# Patient Record
Sex: MALE | Race: WHITE | NOT HISPANIC OR LATINO
[De-identification: names, ages, dates, MRNs, and addresses within clinical notes are randomized per-mention and may not be internally consistent; named-entity substitution may affect disease eponyms.]

---

## 2022-04-22 ENCOUNTER — RESULT REVIEW (OUTPATIENT)
Age: 52
End: 2022-04-22

## 2022-05-16 ENCOUNTER — OUTPATIENT (OUTPATIENT)
Dept: OUTPATIENT SERVICES | Facility: HOSPITAL | Age: 52
LOS: 1 days | End: 2022-05-16
Payer: COMMERCIAL

## 2022-05-16 VITALS
OXYGEN SATURATION: 100 % | RESPIRATION RATE: 15 BRPM | WEIGHT: 171.74 LBS | HEIGHT: 68.5 IN | SYSTOLIC BLOOD PRESSURE: 125 MMHG | DIASTOLIC BLOOD PRESSURE: 89 MMHG | HEART RATE: 77 BPM | TEMPERATURE: 98 F

## 2022-05-16 DIAGNOSIS — N48.6 INDURATION PENIS PLASTICA: ICD-10-CM

## 2022-05-16 DIAGNOSIS — Z98.890 OTHER SPECIFIED POSTPROCEDURAL STATES: Chronic | ICD-10-CM

## 2022-05-16 DIAGNOSIS — E80.4 GILBERT SYNDROME: Chronic | ICD-10-CM

## 2022-05-16 DIAGNOSIS — Z01.818 ENCOUNTER FOR OTHER PREPROCEDURAL EXAMINATION: ICD-10-CM

## 2022-05-16 PROBLEM — Z00.00 ENCOUNTER FOR PREVENTIVE HEALTH EXAMINATION: Status: ACTIVE | Noted: 2022-05-16

## 2022-05-16 LAB
HCT VFR BLD CALC: 47.1 % — SIGNIFICANT CHANGE UP (ref 39–50)
HGB BLD-MCNC: 15.3 G/DL — SIGNIFICANT CHANGE UP (ref 13–17)
MCHC RBC-ENTMCNC: 27.6 PG — SIGNIFICANT CHANGE UP (ref 27–34)
MCHC RBC-ENTMCNC: 32.5 GM/DL — SIGNIFICANT CHANGE UP (ref 32–36)
MCV RBC AUTO: 85 FL — SIGNIFICANT CHANGE UP (ref 80–100)
NRBC # BLD: 0 /100 WBCS — SIGNIFICANT CHANGE UP (ref 0–0)
PLATELET # BLD AUTO: 259 K/UL — SIGNIFICANT CHANGE UP (ref 150–400)
RBC # BLD: 5.54 M/UL — SIGNIFICANT CHANGE UP (ref 4.2–5.8)
RBC # FLD: 13.3 % — SIGNIFICANT CHANGE UP (ref 10.3–14.5)
WBC # BLD: 6.91 K/UL — SIGNIFICANT CHANGE UP (ref 3.8–10.5)
WBC # FLD AUTO: 6.91 K/UL — SIGNIFICANT CHANGE UP (ref 3.8–10.5)

## 2022-05-16 PROCEDURE — G0463: CPT

## 2022-05-16 PROCEDURE — 85027 COMPLETE CBC AUTOMATED: CPT

## 2022-05-16 NOTE — H&P PST ADULT - FALL HARM RISK - UNIVERSAL INTERVENTIONS
Bed in lowest position, wheels locked, appropriate side rails in place/Call bell, personal items and telephone in reach/Instruct patient to call for assistance before getting out of bed or chair/Non-slip footwear when patient is out of bed/[place] to call system/Physically safe environment - no spills, clutter or unnecessary equipment/Purposeful Proactive Rounding/Room/bathroom lighting operational, light cord in reach

## 2022-05-16 NOTE — H&P PST ADULT - NEGATIVE GASTROINTESTINAL SYMPTOMS
no nausea/no vomiting/no constipation/no change in bowel habits/no abdominal pain/no melena/no hematochezia/no jaundice

## 2022-05-16 NOTE — H&P PST ADULT - NSICDXPASTSURGICALHX_GEN_ALL_CORE_FT
PAST SURGICAL HISTORY:  H/O colonoscopy -april 2022    S/P inguinal hernia repair -bilateral 2016

## 2022-05-16 NOTE — H&P PST ADULT - PROBLEM SELECTOR PLAN 1
Moustapha Procedure  -cbc done at [facility]  -preop instructions provided. All questions answered

## 2022-05-16 NOTE — H&P PST ADULT - HISTORY OF PRESENT ILLNESS
51 year old male with PMH of HTN, Gilbert Syndrome, Seasonal Allergies with Penis Angulation. Patient endorses that he wishes to correct angulation. He denies fever, chills, gross hematuria, dysuria. He presents to PST today for evaluation prior to scheduled Moustapha Procedure    preop covid swab 6/4 @ [facility]

## 2022-05-16 NOTE — H&P PST ADULT - NSICDXPASTMEDICALHX_GEN_ALL_CORE_FT
PAST MEDICAL HISTORY:  Gilbert syndrome -not under therapy, last LFT's done september 2021, normal as per patient    HTN (hypertension)     Seasonal allergies

## 2022-05-30 ENCOUNTER — TRANSCRIPTION ENCOUNTER (OUTPATIENT)
Age: 52
End: 2022-05-30

## 2022-06-04 ENCOUNTER — OUTPATIENT (OUTPATIENT)
Dept: OUTPATIENT SERVICES | Facility: HOSPITAL | Age: 52
LOS: 1 days | End: 2022-06-04
Payer: COMMERCIAL

## 2022-06-04 DIAGNOSIS — Z98.890 OTHER SPECIFIED POSTPROCEDURAL STATES: Chronic | ICD-10-CM

## 2022-06-04 DIAGNOSIS — Z11.52 ENCOUNTER FOR SCREENING FOR COVID-19: ICD-10-CM

## 2022-06-04 LAB — SARS-COV-2 RNA SPEC QL NAA+PROBE: SIGNIFICANT CHANGE UP

## 2022-06-04 PROCEDURE — U0005: CPT

## 2022-06-04 PROCEDURE — C9803: CPT

## 2022-06-04 PROCEDURE — U0003: CPT

## 2022-06-29 PROBLEM — E80.4 GILBERT SYNDROME: Chronic | Status: ACTIVE | Noted: 2022-05-16

## 2022-06-29 PROBLEM — I10 ESSENTIAL (PRIMARY) HYPERTENSION: Chronic | Status: ACTIVE | Noted: 2022-05-16

## 2022-06-29 PROBLEM — J30.2 OTHER SEASONAL ALLERGIC RHINITIS: Chronic | Status: ACTIVE | Noted: 2022-05-16

## 2022-06-30 ENCOUNTER — APPOINTMENT (OUTPATIENT)
Dept: ORTHOPEDIC SURGERY | Facility: CLINIC | Age: 52
End: 2022-06-30

## 2022-06-30 VITALS — WEIGHT: 170 LBS | BODY MASS INDEX: 25.47 KG/M2 | HEIGHT: 68.5 IN

## 2022-06-30 DIAGNOSIS — I10 ESSENTIAL (PRIMARY) HYPERTENSION: ICD-10-CM

## 2022-06-30 PROCEDURE — 99213 OFFICE O/P EST LOW 20 MIN: CPT

## 2022-06-30 PROCEDURE — 73564 X-RAY EXAM KNEE 4 OR MORE: CPT | Mod: LT

## 2022-06-30 NOTE — HISTORY OF PRESENT ILLNESS
[8] : 8 [0] : 0 [Dull/Aching] : dull/aching [Sharp] : sharp [Intermittent] : intermittent [Rest] : rest [Meds] : meds [Ice] : ice [Exercising] : exercising [Stairs] : stairs [de-identified] : 51M here with left knee pain for about a week. He noticed anterior knee pain while running. Pain has progressively worsened. He hears some "clicking" in the front of his knee\par \par Hx partial quad tendon tear in 2020 treated non-op\par PMH: none [] : no [FreeTextEntry5] :  HARJIT IRIZARRY is a 51 year male who is here today for lt knee pain. he states he does a lot of running and has been having pain for about a week ago. the pain has been getting worst the past few days.  [FreeTextEntry1] : lt knee  [FreeTextEntry9] : knee brace

## 2022-06-30 NOTE — PHYSICAL EXAM
[Left] : left knee [5___] : hamstring 5[unfilled]/5 [] : non-antalgic [TWNoteComboBox7] : flexion 130 degrees

## 2022-06-30 NOTE — DISCUSSION/SUMMARY
[de-identified] : 51m with left knee chondromalacia\par 1) treatment options reviewed\par 2) voltaren gel\par 3) activity modification, stretching\par 4) discussed possibility csi \par 5) fu 4 weeks

## 2022-06-30 NOTE — IMAGING
[Left] : left knee [AP] : anteroposterior [Lateral] : lateral [[place]] : skyline [AP Standing] : anteroposterior standing [There are no fractures, subluxations or dislocations. No significant abnormalities are seen] : There are no fractures, subluxations or dislocations. No significant abnormalities are seen

## 2022-07-12 ENCOUNTER — APPOINTMENT (OUTPATIENT)
Dept: ORTHOPEDIC SURGERY | Facility: CLINIC | Age: 52
End: 2022-07-12

## 2022-07-12 VITALS — WEIGHT: 170 LBS | BODY MASS INDEX: 25.76 KG/M2 | HEIGHT: 68 IN

## 2022-07-12 PROCEDURE — 99213 OFFICE O/P EST LOW 20 MIN: CPT

## 2022-07-12 NOTE — HISTORY OF PRESENT ILLNESS
[3] : 3 [0] : 0 [Dull/Aching] : dull/aching [Sharp] : sharp [Intermittent] : intermittent [Rest] : rest [Meds] : meds [Ice] : ice [Exercising] : exercising [Stairs] : stairs [de-identified] : 07/12/22: Here to f/up left knee. Has seen partial improvement with his left knee pain. Has been limiting his running distances. Notes some anterior pain when running. \par \par 06/30/22: 51M here with left knee pain for about a week. He noticed anterior knee pain while running. Pain has progressively worsened. He hears some "clicking" in the front of his knee\par \par Hx partial quad tendon tear in 2020 treated non-op\par PMH: none [] : no [FreeTextEntry1] : lt knee  [FreeTextEntry5] :  HARJIT IRIZARRY is a 51 year male who is here today for lt knee pain. He is doing better since last visit. he still has pain when going down stairs and running.  [FreeTextEntry9] : knee brace  [de-identified] : u

## 2022-07-12 NOTE — DISCUSSION/SUMMARY
[de-identified] : 51m with chondromalacia patella/pfs\par 1) cryotherapy, rest and activity/exercise modification\par 2) discussed availability of csi if pain worsens. \par 3) rtc prn\par \par Entered by Becky Leyva acting as scribe.\par \par \par

## 2022-07-19 ENCOUNTER — APPOINTMENT (OUTPATIENT)
Dept: ORTHOPEDIC SURGERY | Facility: CLINIC | Age: 52
End: 2022-07-19

## 2022-07-19 VITALS — BODY MASS INDEX: 25.76 KG/M2 | HEIGHT: 68 IN | WEIGHT: 170 LBS

## 2022-07-19 PROCEDURE — 99213 OFFICE O/P EST LOW 20 MIN: CPT

## 2022-07-19 NOTE — DISCUSSION/SUMMARY
[de-identified] : 51m with continued left knee glory despite conservative treatment\par 1) mri of left knee\par 2) cryotherapy\par 3) activity modification\par 4) rtc after mri

## 2022-07-19 NOTE — PHYSICAL EXAM
[Left] : left knee [5___] : hamstring 5[unfilled]/5 [] : non-antalgic [TWNoteComboBox7] : flexion 135 degrees [de-identified] : extension 0 degrees

## 2022-07-19 NOTE — HISTORY OF PRESENT ILLNESS
[8] : 8 [1] : 2 [Dull/Aching] : dull/aching [Sharp] : sharp [Intermittent] : intermittent [Rest] : rest [Meds] : meds [Ice] : ice [Exercising] : exercising [Stairs] : stairs [de-identified] : 07/19/22: here for f/up for left knee. has noticed increased pain and soreness in left knee with running. currently training for a marathon. has noticed with increased milage the pain has increased under the kneecap. \par \par 07/12/22: Here to f/up left knee. Has seen partial improvement with his left knee pain. Has been limiting his running distances. Notes some anterior pain when running. \par \par 06/30/22: 51M here with left knee pain for about a week. He noticed anterior knee pain while running. Pain has progressively worsened. He hears some "clicking" in the front of his knee\par \par Hx partial quad tendon tear in 2020 treated non-op\par PMH: none [] : no [FreeTextEntry1] : lt knee  [FreeTextEntry5] :  HARJIT IRIZARRY is a 51 year male who is here today for lt knee pain. He is doing worst since last visit. He states he went running last wednesday and started having pain and felt as if his knee was cracking as he was running.  [FreeTextEntry9] : knee brace

## 2022-07-20 ENCOUNTER — FORM ENCOUNTER (OUTPATIENT)
Age: 52
End: 2022-07-20

## 2022-07-21 ENCOUNTER — APPOINTMENT (OUTPATIENT)
Dept: MRI IMAGING | Facility: CLINIC | Age: 52
End: 2022-07-21

## 2022-07-21 PROCEDURE — 73721 MRI JNT OF LWR EXTRE W/O DYE: CPT | Mod: LT

## 2022-07-26 ENCOUNTER — APPOINTMENT (OUTPATIENT)
Dept: ORTHOPEDIC SURGERY | Facility: CLINIC | Age: 52
End: 2022-07-26

## 2022-07-26 VITALS — HEIGHT: 68 IN | BODY MASS INDEX: 25.76 KG/M2 | WEIGHT: 170 LBS

## 2022-07-26 DIAGNOSIS — Z78.9 OTHER SPECIFIED HEALTH STATUS: ICD-10-CM

## 2022-07-26 PROCEDURE — 99214 OFFICE O/P EST MOD 30 MIN: CPT

## 2022-07-26 NOTE — DISCUSSION/SUMMARY
[de-identified] : 51m with left knee chondral defect patella. \par 1) discussed availability of visco injections for the left knee pt would like to proceed, will request auth. \par 2) cryotherapy, rest and activity/exercise modification \par 3) rtc with auth for injections\par \par Entered by Becky Leyva acting as scribe.\par

## 2022-07-26 NOTE — HISTORY OF PRESENT ILLNESS
[2] : 2 [0] : 0 [Dull/Aching] : dull/aching [Sharp] : sharp [Intermittent] : intermittent [Rest] : rest [Meds] : meds [Ice] : ice [Exercising] : exercising [Stairs] : stairs [de-identified] : 07/26/22: Here to f/up left knee and review MRI results. \par \par 07/19/22: here for f/up for left knee. has noticed increased pain and soreness in left knee with running. currently training for a marathon. has noticed with increased milage the pain has increased under the kneecap. \par \par 07/12/22: Here to f/up left knee. Has seen partial improvement with his left knee pain. Has been limiting his running distances. Notes some anterior pain when running. \par \par 06/30/22: 51M here with left knee pain for about a week. He noticed anterior knee pain while running. Pain has progressively worsened. He hears some "clicking" in the front of his knee\par \par Hx partial quad tendon tear in 2020 treated non-op\par PMH: none [] : no [FreeTextEntry1] : lt knee  [FreeTextEntry5] :  HARJIT IRIZARRY is a 51 year male who is here today for lt knee MRI results [FreeTextEntry9] : knee brace

## 2022-07-26 NOTE — DATA REVIEWED
[MRI] : MRI [Left] : left [Knee] : knee [Report was reviewed and noted in the chart] : The report was reviewed and noted in the chart [I independently reviewed and interpreted images and report] : I independently reviewed and interpreted images and report [I reviewed the films/CD] : I reviewed the films/CD [FreeTextEntry1] : 07.21.22\par 1. Marrow edema in the central and lateral patella measuring 3 cm with associated 1 cm chondral defect in the mid lateral patella facet and mild patellofemoral effusion and synovitis without MRI evidence of loose body. The findings could represent acute osteochondral injury. The possibility of loose chondral fragments should be considered.\par 2. No ligament tear, meniscal tear, or extensor mechanism tear.\par 3. Clinical correlation and follow up is recommended.

## 2022-08-02 ENCOUNTER — APPOINTMENT (OUTPATIENT)
Dept: ORTHOPEDIC SURGERY | Facility: CLINIC | Age: 52
End: 2022-08-02

## 2022-08-02 VITALS — BODY MASS INDEX: 25.76 KG/M2 | WEIGHT: 170 LBS | HEIGHT: 68 IN

## 2022-08-02 DIAGNOSIS — M25.562 PAIN IN LEFT KNEE: ICD-10-CM

## 2022-08-02 PROCEDURE — 20611 DRAIN/INJ JOINT/BURSA W/US: CPT

## 2022-08-02 PROCEDURE — 99213 OFFICE O/P EST LOW 20 MIN: CPT | Mod: 25

## 2022-08-02 NOTE — PROCEDURE
[FreeTextEntry3] : The risks, benefits and contents of the injection have been discussed.  Risks include but are not limited to allergic reaction, flare reaction, permanent white skin discoloration at the injection site and infection.  The patient understands the risks and agrees to having the injection.  All questions have been answered.\par \par Large joint injection was performed  of the [] knee. The indication for this procedure was x-ray evidence of Osteoarthritis on this or prior visit. The site was prepped with alcohol and betadine. An injection of Lidocaine 3cc of 1% , Euflexxa, # 1 was used. \par \par Patient was advised to call if redness, pain or fever occur and apply ice for 15 minutes out of every hour for the next 12-24 hours as tolerated. \par \par Patient has tried OTC's including aspirin, Ibuprofen, Aleve, etc or prescription NSAIDS, and/or exercises at home and/or physical therapy without satisfactory response, patient had decreased mobility in the joint and the risks benefits, and alternatives have been discussed, and verbal consent was obtained. \par \par Ultrasound guidance was indicated for this patient due to prior failure or difficult injection.\par \par

## 2022-08-02 NOTE — HISTORY OF PRESENT ILLNESS
[2] : 2 [Dull/Aching] : dull/aching [Sharp] : sharp [Intermittent] : intermittent [Rest] : rest [Meds] : meds [Ice] : ice [Exercising] : exercising [Stairs] : stairs [1] : 1 [Orthovisc] : Orthovisc [Localized] : localized [0] : 0 [de-identified] : 8/2/22: f/up L knee, orthovisc #1 today\par \par 07/26/22: Here to f/up left knee and review MRI results. \par \par 07/19/22: here for f/up for left knee. has noticed increased pain and soreness in left knee with running. currently training for a marathon. has noticed with increased milage the pain has increased under the kneecap. \par \par 07/12/22: Here to f/up left knee. Has seen partial improvement with his left knee pain. Has been limiting his running distances. Notes some anterior pain when running. \par \par 06/30/22: 51M here with left knee pain for about a week. He noticed anterior knee pain while running. Pain has progressively worsened. He hears some "clicking" in the front of his knee\par \par Hx partial quad tendon tear in 2020 treated non-op\par PMH: none [] : Post Surgical Visit: no [FreeTextEntry1] : lt knee  [FreeTextEntry5] :  HARJIT IRIZARRY is a 51 year male who is here today for lt knee MRI results [FreeTextEntry9] : knee brace  [de-identified] : left knee

## 2022-08-02 NOTE — DISCUSSION/SUMMARY
[de-identified] : 51m with left knee chondral defect patella. \par 1) orthovisc #1 L knee, tolerated well\par 2) cryotherapy\par 3) rto next week\par \par \par Progress Note completed by Luke Ornelas PA-C\par * Dr. Erazo - The documentation recorded accurately reflects the decisions made by me during this visit.\par

## 2022-08-02 NOTE — PHYSICAL EXAM
[Left] : left knee [NL (0)] : extension 0 degrees [5___] : hamstring 5[unfilled]/5 [] : no extensor lag [TWNoteComboBox7] : flexion 130 degrees

## 2022-08-10 ENCOUNTER — APPOINTMENT (OUTPATIENT)
Dept: ORTHOPEDIC SURGERY | Facility: CLINIC | Age: 52
End: 2022-08-10
Payer: COMMERCIAL

## 2022-08-10 VITALS — WEIGHT: 170 LBS | BODY MASS INDEX: 25.76 KG/M2 | HEIGHT: 68 IN

## 2022-08-10 PROCEDURE — 20611 DRAIN/INJ JOINT/BURSA W/US: CPT

## 2022-08-10 PROCEDURE — 99212 OFFICE O/P EST SF 10 MIN: CPT | Mod: 25

## 2022-08-10 NOTE — PROCEDURE
[FreeTextEntry3] : Large joint injection was performed  of the left knee. The indication for this procedure was x-ray evidence of Osteoarthritis on this or prior visit. The site was prepped with alcohol and betadine. An injection of Lidocaine 3cc of 1% , Orthovisc, # 2 was used. \par \par Patient was advised to call if redness, pain or fever occur and apply ice for 15 minutes out of every hour for the next 12-24 hours as tolerated. \par \par Patient has tried OTC's including aspirin, Ibuprofen, Aleve, etc or prescription NSAIDS, and/or exercises at home and/or physical therapy without satisfactory response, patient had decreased mobility in the joint and the risks benefits, and alternatives have been discussed, and verbal consent was obtained. \par \par The risks, benefits and contents of the injection have been discussed.  Risks include but are not limited to allergic reaction, flare reaction, permanent white skin discoloration at the injection site and infection.  The patient understands the risks and agrees to having the injection.  All questions have been answered.\par \par Ultrasound guidance was indicated for this patient due to prior failure or difficult injection.\par

## 2022-08-10 NOTE — HISTORY OF PRESENT ILLNESS
[2] : 2 [0] : 0 [Dull/Aching] : dull/aching [Localized] : localized [Sharp] : sharp [Intermittent] : intermittent [Rest] : rest [Meds] : meds [Ice] : ice [Exercising] : exercising [Stairs] : stairs [1] : 1 [Orthovisc] : Orthovisc [de-identified] : 8/10/22: F/U left knee orthovisc #2 today. Has had some improvement, less pain since first injection. \par \par 8/2/22: f/up L knee, orthovisc #1 today\par \par 07/26/22: Here to f/up left knee and review MRI results. \par \par 07/19/22: here for f/up for left knee. has noticed increased pain and soreness in left knee with running. currently training for a marathon. has noticed with increased milage the pain has increased under the kneecap. \par \par 07/12/22: Here to f/up left knee. Has seen partial improvement with his left knee pain. Has been limiting his running distances. Notes some anterior pain when running. \par \par 06/30/22: 51M here with left knee pain for about a week. He noticed anterior knee pain while running. Pain has progressively worsened. He hears some "clicking" in the front of his knee\par \par Hx partial quad tendon tear in 2020 treated non-op\par PMH: none [] : Post Surgical Visit: no [FreeTextEntry1] : lt knee  [FreeTextEntry5] :  HARJIT IRIZARRY is a 51 year male who is here today for lt knee MRI results [FreeTextEntry9] : knee brace  [de-identified] : left knee

## 2022-08-10 NOTE — DISCUSSION/SUMMARY
[de-identified] : 51m with left knee chondral defect patella. Orthovisc #2 Injection tolerated well. Post injection instructions reviewed.\par 1) wbat, cryotherapy\par 2) rtc 1 week\par \par Entered by Becky Leyva acting as scribe.\par \par

## 2022-08-17 ENCOUNTER — APPOINTMENT (OUTPATIENT)
Dept: ORTHOPEDIC SURGERY | Facility: CLINIC | Age: 52
End: 2022-08-17
Payer: COMMERCIAL

## 2022-08-17 PROCEDURE — 99212 OFFICE O/P EST SF 10 MIN: CPT | Mod: 25

## 2022-08-17 PROCEDURE — 20611 DRAIN/INJ JOINT/BURSA W/US: CPT

## 2022-08-17 NOTE — PROCEDURE
[FreeTextEntry3] : Large joint injection was performed  of the left knee. The indication for this procedure was x-ray evidence of Osteoarthritis on this or prior visit. The site was prepped with alcohol and betadine. An injection of Lidocaine 3cc of 1% , Orthovisc, # 3 was used. \par \par Patient was advised to call if redness, pain or fever occur and apply ice for 15 minutes out of every hour for the next 12-24 hours as tolerated. \par \par Patient has tried OTC's including aspirin, Ibuprofen, Aleve, etc or prescription NSAIDS, and/or exercises at home and/or physical therapy without satisfactory response, patient had decreased mobility in the joint and the risks benefits, and alternatives have been discussed, and verbal consent was obtained. \par \par The risks, benefits and contents of the injection have been discussed.  Risks include but are not limited to allergic reaction, flare reaction, permanent white skin discoloration at the injection site and infection.  The patient understands the risks and agrees to having the injection.  All questions have been answered.\par \par Ultrasound guidance was indicated for this patient due to prior failure or difficult injection.\par

## 2022-08-17 NOTE — DISCUSSION/SUMMARY
[de-identified] : 51m with left knee chondral defect patella. Orthovisc #3 Injection tolerated well. Post injection instructions reviewed.\par 1) wbat, cryotherapy\par 2) rtc 1 week\par \par Entered by Becky Leyva acting as scribe.\par \par

## 2022-08-17 NOTE — HISTORY OF PRESENT ILLNESS
[2] : 2 [0] : 0 [Dull/Aching] : dull/aching [Localized] : localized [Sharp] : sharp [Intermittent] : intermittent [Rest] : rest [Meds] : meds [Ice] : ice [Exercising] : exercising [Stairs] : stairs [1] : 1 [Orthovisc] : Orthovisc [de-identified] : 8/17/22: Here to f/up left knee and Orthovisc #3\par 8/10/22: F/U left knee orthovisc #2 today. Has had some improvement, less pain since first injection. \par \par 8/2/22: f/up L knee, orthovisc #1 today\par \par 07/26/22: Here to f/up left knee and review MRI results. \par \par 07/19/22: here for f/up for left knee. has noticed increased pain and soreness in left knee with running. currently training for a marathon. has noticed with increased milage the pain has increased under the kneecap. \par \par 07/12/22: Here to f/up left knee. Has seen partial improvement with his left knee pain. Has been limiting his running distances. Notes some anterior pain when running. \par \par 06/30/22: 51M here with left knee pain for about a week. He noticed anterior knee pain while running. Pain has progressively worsened. He hears some "clicking" in the front of his knee\par \par Hx partial quad tendon tear in 2020 treated non-op\par PMH: none\par \par  [] : Post Surgical Visit: no [FreeTextEntry1] : lt knee  [FreeTextEntry5] :  HARJIT IRIZARRY is a 51 year male who is here today for lt knee MRI results [FreeTextEntry9] : knee brace  [de-identified] : orthovisc [de-identified] : left knee

## 2022-08-24 ENCOUNTER — APPOINTMENT (OUTPATIENT)
Dept: ORTHOPEDIC SURGERY | Facility: CLINIC | Age: 52
End: 2022-08-24
Payer: COMMERCIAL

## 2022-08-24 VITALS — BODY MASS INDEX: 25.76 KG/M2 | WEIGHT: 170 LBS | HEIGHT: 68 IN

## 2022-08-24 PROCEDURE — 20611 DRAIN/INJ JOINT/BURSA W/US: CPT | Mod: LT

## 2022-08-24 PROCEDURE — 99212 OFFICE O/P EST SF 10 MIN: CPT | Mod: 25

## 2022-08-24 NOTE — PROCEDURE
[FreeTextEntry3] : Large joint injection was performed  of the left knee. The indication for this procedure was x-ray evidence of Osteoarthritis on this or prior visit. The site was prepped with alcohol and betadine. An injection of Lidocaine 3cc of 1% , Orthovisc, # 4 was used. \par \par Patient was advised to call if redness, pain or fever occur and apply ice for 15 minutes out of every hour for the next 12-24 hours as tolerated. \par \par Patient has tried OTC's including aspirin, Ibuprofen, Aleve, etc or prescription NSAIDS, and/or exercises at home and/or physical therapy without satisfactory response, patient had decreased mobility in the joint and the risks benefits, and alternatives have been discussed, and verbal consent was obtained. \par \par The risks, benefits and contents of the injection have been discussed.  Risks include but are not limited to allergic reaction, flare reaction, permanent white skin discoloration at the injection site and infection.  The patient understands the risks and agrees to having the injection.  All questions have been answered.\par \par Ultrasound guidance was indicated for this patient due to prior failure or difficult injection.\par

## 2022-08-24 NOTE — HISTORY OF PRESENT ILLNESS
[2] : 2 [0] : 0 [Dull/Aching] : dull/aching [Localized] : localized [Sharp] : sharp [Intermittent] : intermittent [Rest] : rest [Meds] : meds [Ice] : ice [Exercising] : exercising [Stairs] : stairs [1] : 1 [Orthovisc] : Orthovisc [de-identified] : 8/24/22: Here for left knee orthovisc #4. Some improvement, better with stairs. \par 8/17/22: Here to f/up left knee and Orthovisc #3\par 8/10/22: F/U left knee orthovisc #2 today. Has had some improvement, less pain since first injection. \par \par 8/2/22: f/up L knee, orthovisc #1 today\par \par 07/26/22: Here to f/up left knee and review MRI results. \par \par 07/19/22: here for f/up for left knee. has noticed increased pain and soreness in left knee with running. currently training for a marathon. has noticed with increased milage the pain has increased under the kneecap. \par \par 07/12/22: Here to f/up left knee. Has seen partial improvement with his left knee pain. Has been limiting his running distances. Notes some anterior pain when running. \par \par 06/30/22: 51M here with left knee pain for about a week. He noticed anterior knee pain while running. Pain has progressively worsened. He hears some "clicking" in the front of his knee\par \par Hx partial quad tendon tear in 2020 treated non-op\par PMH: none\par \par  [] : Post Surgical Visit: no [FreeTextEntry1] : lt knee  [FreeTextEntry5] :  HARJIT IRIZARRY is a 51 year male who is here today for lt knee MRI results [FreeTextEntry9] : knee brace  [de-identified] : orthovisc [de-identified] : left knee

## 2022-08-24 NOTE — DISCUSSION/SUMMARY
[de-identified] : 51m with left knee chondral defect patella. Orthovisc #4 Injection tolerated well. Post injection instructions reviewed.\par 1) wbat, cryotherapy\par 2) rtc 6 weeks\par \par Entered by Becky Leyva acting as scribe.\par \par

## 2022-10-18 ENCOUNTER — OUTPATIENT (OUTPATIENT)
Dept: OUTPATIENT SERVICES | Facility: HOSPITAL | Age: 52
LOS: 1 days | End: 2022-10-18
Payer: COMMERCIAL

## 2022-10-18 VITALS
RESPIRATION RATE: 20 BRPM | TEMPERATURE: 98 F | OXYGEN SATURATION: 97 % | DIASTOLIC BLOOD PRESSURE: 86 MMHG | SYSTOLIC BLOOD PRESSURE: 124 MMHG | HEART RATE: 83 BPM | HEIGHT: 69 IN | WEIGHT: 171.3 LBS

## 2022-10-18 DIAGNOSIS — Z98.890 OTHER SPECIFIED POSTPROCEDURAL STATES: Chronic | ICD-10-CM

## 2022-10-18 DIAGNOSIS — N48.6 INDURATION PENIS PLASTICA: ICD-10-CM

## 2022-10-18 DIAGNOSIS — Z01.818 ENCOUNTER FOR OTHER PREPROCEDURAL EXAMINATION: ICD-10-CM

## 2022-10-18 PROCEDURE — G0463: CPT

## 2022-10-18 RX ORDER — LORATADINE 10 MG/1
1 TABLET ORAL
Qty: 0 | Refills: 0 | DISCHARGE

## 2022-10-18 RX ORDER — SODIUM CHLORIDE 9 MG/ML
1000 INJECTION, SOLUTION INTRAVENOUS
Refills: 0 | Status: DISCONTINUED | OUTPATIENT
Start: 2022-11-08 | End: 2022-11-22

## 2022-10-18 NOTE — H&P PST ADULT - NSICDXPASTMEDICALHX_GEN_ALL_CORE_FT
PAST MEDICAL HISTORY:  Gilbert syndrome -not under therapy, last LFT's done september 2021, normal as per patient    HTN (hypertension)     Osteoarthritis     Seasonal allergies      PAST MEDICAL HISTORY:  Gilbert syndrome -not under therapy, last LFT's done september 2021, normal as per patient    HTN (hypertension)     Induration penis plastica     Osteoarthritis     Seasonal allergies

## 2022-10-18 NOTE — H&P PST ADULT - HISTORY OF PRESENT ILLNESS
51 year old male with PMH of HTN, Gilbert Syndrome, Seasonal Allergies with Penis Angulation. Patient endorses that he wishes to correct angulation. He denies fever, chills, gross hematuria, dysuria. He presents to PST today for evaluation prior to scheduled Moustapha Procedure    Denies Recent travel, Exposure or Covid symptoms  Covid test   51 year old male with history of HTN, Gilbert Syndrome, Seasonal Allergies with Penis Angulation. Patient endorses that he wishes to correct angulation. Coming in for Moustapha procedure on 11/8/2022.    Denies Recent travel, Exposure or Covid symptoms  Covid test-11/5/2022    ****Pt was initially scheduled in June & did come for procedure- was cancelled because of covid exposure from his wife .****

## 2022-11-05 ENCOUNTER — OUTPATIENT (OUTPATIENT)
Dept: OUTPATIENT SERVICES | Facility: HOSPITAL | Age: 52
LOS: 1 days | End: 2022-11-05
Payer: COMMERCIAL

## 2022-11-05 DIAGNOSIS — Z11.52 ENCOUNTER FOR SCREENING FOR COVID-19: ICD-10-CM

## 2022-11-05 DIAGNOSIS — Z98.890 OTHER SPECIFIED POSTPROCEDURAL STATES: Chronic | ICD-10-CM

## 2022-11-05 LAB — SARS-COV-2 RNA SPEC QL NAA+PROBE: SIGNIFICANT CHANGE UP

## 2022-11-05 PROCEDURE — U0005: CPT

## 2022-11-05 PROCEDURE — U0003: CPT

## 2022-11-05 PROCEDURE — C9803: CPT

## 2022-11-07 ENCOUNTER — TRANSCRIPTION ENCOUNTER (OUTPATIENT)
Age: 52
End: 2022-11-07

## 2022-11-08 ENCOUNTER — OUTPATIENT (OUTPATIENT)
Dept: OUTPATIENT SERVICES | Facility: HOSPITAL | Age: 52
LOS: 1 days | End: 2022-11-08
Payer: COMMERCIAL

## 2022-11-08 ENCOUNTER — TRANSCRIPTION ENCOUNTER (OUTPATIENT)
Age: 52
End: 2022-11-08

## 2022-11-08 VITALS
WEIGHT: 171.3 LBS | SYSTOLIC BLOOD PRESSURE: 124 MMHG | HEART RATE: 92 BPM | TEMPERATURE: 98 F | DIASTOLIC BLOOD PRESSURE: 83 MMHG | HEIGHT: 69 IN | OXYGEN SATURATION: 100 % | RESPIRATION RATE: 16 BRPM

## 2022-11-08 VITALS
SYSTOLIC BLOOD PRESSURE: 109 MMHG | OXYGEN SATURATION: 96 % | DIASTOLIC BLOOD PRESSURE: 65 MMHG | TEMPERATURE: 97 F | HEART RATE: 93 BPM | RESPIRATION RATE: 18 BRPM

## 2022-11-08 DIAGNOSIS — Z98.890 OTHER SPECIFIED POSTPROCEDURAL STATES: Chronic | ICD-10-CM

## 2022-11-08 DIAGNOSIS — N48.6 INDURATION PENIS PLASTICA: ICD-10-CM

## 2022-11-08 PROCEDURE — 54360 PENIS PLASTIC SURGERY: CPT

## 2022-11-08 RX ORDER — LIDOCAINE HCL 20 MG/ML
0.2 VIAL (ML) INJECTION ONCE
Refills: 0 | Status: COMPLETED | OUTPATIENT
Start: 2022-11-08 | End: 2022-11-08

## 2022-11-08 RX ORDER — ONDANSETRON 8 MG/1
4 TABLET, FILM COATED ORAL ONCE
Refills: 0 | Status: DISCONTINUED | OUTPATIENT
Start: 2022-11-08 | End: 2022-11-22

## 2022-11-08 RX ORDER — FENTANYL CITRATE 50 UG/ML
25 INJECTION INTRAVENOUS
Refills: 0 | Status: DISCONTINUED | OUTPATIENT
Start: 2022-11-08 | End: 2022-11-08

## 2022-11-08 RX ORDER — SODIUM CHLORIDE 9 MG/ML
1000 INJECTION, SOLUTION INTRAVENOUS
Refills: 0 | Status: DISCONTINUED | OUTPATIENT
Start: 2022-11-08 | End: 2022-11-22

## 2022-11-08 RX ORDER — CIPROFLOXACIN LACTATE 400MG/40ML
400 VIAL (ML) INTRAVENOUS ONCE
Refills: 0 | Status: COMPLETED | OUTPATIENT
Start: 2022-11-08 | End: 2022-11-08

## 2022-11-08 RX ORDER — LISINOPRIL 2.5 MG/1
1 TABLET ORAL
Qty: 0 | Refills: 0 | DISCHARGE

## 2022-11-08 RX ADMIN — SODIUM CHLORIDE 100 MILLILITER(S): 9 INJECTION, SOLUTION INTRAVENOUS at 06:56

## 2022-11-08 NOTE — ASU PATIENT PROFILE, ADULT - NSICDXPASTMEDICALHX_GEN_ALL_CORE_FT
PAST MEDICAL HISTORY:  Gilbert syndrome -not under therapy, last LFT's done september 2021, normal as per patient    HTN (hypertension)     Induration penis plastica     Osteoarthritis     Seasonal allergies

## 2022-11-08 NOTE — ASU DISCHARGE PLAN (ADULT/PEDIATRIC) - CARE PROVIDER_API CALL
Zackery Shaffer)  Urology  [address], [address]  [place] [address]  Phone: [phone]  Fax: [phone]  Follow Up Time: 1 week

## 2022-11-08 NOTE — ASU DISCHARGE PLAN (ADULT/PEDIATRIC) - ASU DC SPECIAL INSTRUCTIONSFT
Discharge Instructions for HARRIS    Follow up: Please call my office to schedule a post-operative appointment at the office     Discomfort: You may take pain medication that is given to you if you feel it is needed. You may have some intermittent pain for up to six (6) weeks post operatively. Pain does not signify any problem unless associated with fever, chills, or inability to void.  If you experience any fevers or chills please call immediately as this may be signs of an infection.    If you were prescribed Percocet or Norco, be aware that these medications contain acetaminophen.  Therefore, do not take more than 3000mg of acetaminophen in 24 hours if also taking OTC Tylenol.  You are likely to have a sensation of pulling and discomfort on one or both sides that may last as long as several months. This is a natural sensation of healing.    Stitches: The stitches in the incision will dissolve and fall out by themselves. Sometimes skin stitches may open, allowing a slight gaping of the incision. This is no problem if you keep the area clean.      Swelling, Lumps and Bumps: There are often lumps and bumps that can be felt on the pensi. These are of no concern and with time they will soften and disappear.  Any “black and blue” bruising areas will also resolve.   Sometimes the tissue fluid which causes the swelling migrates to the penile skin and can look alarming; with time, all the swelling will eventually subside but may take weeks.  You may apply an ice-pack for 15 minutes out of every hour for the first 24 -36 hours. This can help with swelling.    Showers/Baths: Do not take showers until 48 hours after surgery. No bathing or swimming until your urologist tells you this is safe.      Return to Work: You should be able to return to work within several days to one week after surgery. Please refrain from strenuous exercise or heavy lifting for at least 2 weeks.    No sexual intercourse for 6 weeks.

## 2022-11-08 NOTE — PRE-ANESTHESIA EVALUATION ADULT - NSANTHPMHFT_GEN_ALL_CORE
51 year old male with history of HTN, Gilbert Syndrome, Seasonal Allergies with Penis Angulation. Patient endorses that he wishes to correct angulation. Coming in for Moustapha procedure on 11/8/2022.

## 2022-11-08 NOTE — ASU PATIENT PROFILE, ADULT - HAVE YOU RECEIVED AT LEAST TWO PFIZER AND/OR MODERNA VACCINATIONS (IN ANY COMBINATION) AND/OR ONE JOHNSON & JOHNSON VACCINATION?
"Chief Complaint   Patient presents with     Pre-Op Exam       Initial /72 (Patient Position: Sitting)   Pulse 77   Ht 1.6 m (5' 3\")   Wt 43.1 kg (95 lb)   SpO2 95%   BMI 16.83 kg/m   Estimated body mass index is 16.83 kg/m  as calculated from the following:    Height as of this encounter: 1.6 m (5' 3\").    Weight as of this encounter: 43.1 kg (95 lb).  Medication Reconciliation: complete  Savannah Frank LPN  " Yes

## 2023-02-09 PROBLEM — M19.90 UNSPECIFIED OSTEOARTHRITIS, UNSPECIFIED SITE: Chronic | Status: ACTIVE | Noted: 2022-10-18

## 2023-02-09 PROBLEM — N48.6 INDURATION PENIS PLASTICA: Chronic | Status: ACTIVE | Noted: 2022-10-18

## 2023-02-28 ENCOUNTER — APPOINTMENT (OUTPATIENT)
Dept: ORTHOPEDIC SURGERY | Facility: CLINIC | Age: 53
End: 2023-02-28
Payer: COMMERCIAL

## 2023-02-28 VITALS — WEIGHT: 166 LBS | BODY MASS INDEX: 25.16 KG/M2 | HEIGHT: 68 IN

## 2023-02-28 PROCEDURE — 20611 DRAIN/INJ JOINT/BURSA W/US: CPT

## 2023-02-28 PROCEDURE — 99213 OFFICE O/P EST LOW 20 MIN: CPT | Mod: 25

## 2023-02-28 NOTE — PROCEDURE
[FreeTextEntry3] : Large joint injection was performed  of the left knee. The indication for this procedure was x-ray evidence of Osteoarthritis on this or prior visit. The site was prepped with alcohol and betadine. An injection of Lidocaine 3cc of 1% , Orthovisc 2ml (15mg/ml), # [1] was used. \par \par Patient was advised to call if redness, pain or fever occur and apply ice for 15 minutes out of every hour for the next 12-24 hours as tolerated. \par \par Patient has tried OTC's including aspirin, Ibuprofen, Aleve, etc or prescription NSAIDS, and/or exercises at home and/or physical therapy without satisfactory response, patient had decreased mobility in the joint and the risks benefits, and alternatives have been discussed, and verbal consent was obtained. \par \par The risks, benefits and contents of the injection have been discussed.  Risks include but are not limited to allergic reaction, flare reaction, permanent white skin discoloration at the injection site and infection.  The patient understands the risks and agrees to having the injection.  All questions have been answered.\par \par Ultrasound guidance was indicated for this patient due to prior failure or difficult injection.\par

## 2023-02-28 NOTE — HISTORY OF PRESENT ILLNESS
[2] : 2 [0] : 0 [Dull/Aching] : dull/aching [Localized] : localized [Sharp] : sharp [Intermittent] : intermittent [Rest] : rest [Meds] : meds [Ice] : ice [Exercising] : exercising [Stairs] : stairs [4] : 4 [Orthovisc] : Orthovisc [de-identified] : 2/28/23: Here to f/up left knee. Reports that he obtained good relief after completing the series of orthovisc injections for the left knee. Reports that over the past 1 month he has been seeing a gradual return of his left knee pain which he describes as being located over the anterior aspect. Uses voltaren gel with relief. \par \par 8/24/22: Here for left knee orthovisc #4. Some improvement, better with stairs. \par 8/17/22: Here to f/up left knee and Orthovisc #3\par 8/10/22: F/U left knee orthovisc #2 today. Has had some improvement, less pain since first injection. \par 8/2/22: f/up L knee, orthovisc #1 today\par \par 07/26/22: Here to f/up left knee and review MRI results. \par \par 07/19/22: here for f/up for left knee. has noticed increased pain and soreness in left knee with running. currently training for a marathon. has noticed with increased milage the pain has increased under the kneecap. \par \par 07/12/22: Here to f/up left knee. Has seen partial improvement with his left knee pain. Has been limiting his running distances. Notes some anterior pain when running. \par \par 06/30/22: 51M here with left knee pain for about a week. He noticed anterior knee pain while running. Pain has progressively worsened. He hears some "clicking" in the front of his knee\par \par Hx partial quad tendon tear in 2020 treated non-op\par PMH: none\par \par  [] : Post Surgical Visit: no [FreeTextEntry1] : lt knee  [FreeTextEntry5] :  HARJIT IRIZARRY is a 51 year male who is here today for lt knee pain follow up. States he was doing better since gel inj. Pain returned 2 weeks ago after playing basketball, it is not as intense compared to initial visit.   [FreeTextEntry9] : knee brace  [de-identified] : orthovisc [de-identified] : 08/24/22 [de-identified] : left knee

## 2023-02-28 NOTE — DISCUSSION/SUMMARY
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 Hx of relief with orthovisc injections, discussed repeat series of injections and pt would like to proceed. \par Orthovisc #1 Injection tolerated well. Post injection instructions reviewed.\par 1) wbat, cryotherapy\par 2) rtc 1 week\par \par Entered by Becky Leyva acting as scribe.\par

## 2023-02-28 NOTE — PHYSICAL EXAM
[Left] : left knee [NL (0)] : extension 0 degrees [5___] : hamstring 5[unfilled]/5 [Negative] : negative Neal's [] : no extensor lag [TWNoteComboBox7] : flexion 130 degrees

## 2023-03-14 ENCOUNTER — APPOINTMENT (OUTPATIENT)
Dept: ORTHOPEDIC SURGERY | Facility: CLINIC | Age: 53
End: 2023-03-14
Payer: COMMERCIAL

## 2023-03-14 VITALS — BODY MASS INDEX: 25.16 KG/M2 | WEIGHT: 166 LBS | HEIGHT: 68 IN

## 2023-03-14 PROCEDURE — 20611 DRAIN/INJ JOINT/BURSA W/US: CPT | Mod: LT

## 2023-03-14 PROCEDURE — 99212 OFFICE O/P EST SF 10 MIN: CPT | Mod: 25

## 2023-03-14 RX ORDER — DICLOFENAC SODIUM 1% 10 MG/G
1 GEL TOPICAL DAILY
Qty: 1 | Refills: 2 | Status: ACTIVE | COMMUNITY
Start: 2022-06-30 | End: 1900-01-01

## 2023-03-14 NOTE — HISTORY OF PRESENT ILLNESS
[2] : 2 [0] : 0 [Dull/Aching] : dull/aching [Localized] : localized [Sharp] : sharp [Intermittent] : intermittent [Rest] : rest [Meds] : meds [Ice] : ice [Exercising] : exercising [Stairs] : stairs [1] : 1 [Orthovisc] : Orthovisc [de-identified] : 3/14/23: Here to f/up left knee and Orthovisc #2\par 2/28/23: Here to f/up left knee. Reports that he obtained good relief after completing the series of orthovisc injections for the left knee. Reports that over the past 1 month he has been seeing a gradual return of his left knee pain which he describes as being located over the anterior aspect. Uses voltaren gel with relief. \par \par 8/24/22: Here for left knee orthovisc #4. Some improvement, better with stairs. \par 8/17/22: Here to f/up left knee and Orthovisc #3\par 8/10/22: F/U left knee orthovisc #2 today. Has had some improvement, less pain since first injection. \par 8/2/22: f/up L knee, orthovisc #1 today\par \par 07/26/22: Here to f/up left knee and review MRI results. \par \par 07/19/22: here for f/up for left knee. has noticed increased pain and soreness in left knee with running. currently training for a marathon. has noticed with increased milage the pain has increased under the kneecap. \par \par 07/12/22: Here to f/up left knee. Has seen partial improvement with his left knee pain. Has been limiting his running distances. Notes some anterior pain when running. \par \par 06/30/22: 51M here with left knee pain for about a week. He noticed anterior knee pain while running. Pain has progressively worsened. He hears some "clicking" in the front of his knee\par \par Hx partial quad tendon tear in 2020 treated non-op\par PMH: none\par \par  [] : Post Surgical Visit: no [FreeTextEntry1] : lt knee  [FreeTextEntry9] : knee brace  [FreeTextEntry5] :  HARJIT IRIZARRY is a 51 year male who is here today for lt knee pain follow up. States he was doing better since gel inj. Pain returned 2 weeks ago after playing basketball, it is not as intense compared to initial visit.   [de-identified] : orthovisc [de-identified] : 2/28/23 [de-identified] : left knee

## 2023-03-14 NOTE — PROCEDURE
[FreeTextEntry3] : Large joint injection was performed  of the left knee. The indication for this procedure was x-ray evidence of Osteoarthritis on this or prior visit. The site was prepped with alcohol and betadine. An injection of Lidocaine 3cc of 1% , Orthovisc 2ml (15mg/ml), # [2] was used. \par \par Patient was advised to call if redness, pain or fever occur and apply ice for 15 minutes out of every hour for the next 12-24 hours as tolerated. \par \par Patient has tried OTC's including aspirin, Ibuprofen, Aleve, etc or prescription NSAIDS, and/or exercises at home and/or physical therapy without satisfactory response, patient had decreased mobility in the joint and the risks benefits, and alternatives have been discussed, and verbal consent was obtained. \par \par The risks, benefits and contents of the injection have been discussed.  Risks include but are not limited to allergic reaction, flare reaction, permanent white skin discoloration at the injection site and infection.  The patient understands the risks and agrees to having the injection.  All questions have been answered.\par \par Ultrasound guidance was indicated for this patient due to prior failure or difficult injection.\par

## 2023-03-14 NOTE — DISCUSSION/SUMMARY
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 Hx of relief with orthovisc injections, discussed repeat series of injections and pt would like to proceed. \par Orthovisc #2 Injection tolerated well. Post injection instructions reviewed.\par 1) wbat, cryotherapy\par 2) rtc 1 week\par \par Entered by Becky Leyva acting as scribe.\par

## 2023-03-21 ENCOUNTER — APPOINTMENT (OUTPATIENT)
Dept: ORTHOPEDIC SURGERY | Facility: CLINIC | Age: 53
End: 2023-03-21
Payer: COMMERCIAL

## 2023-03-21 VITALS — WEIGHT: 166 LBS | BODY MASS INDEX: 25.16 KG/M2 | HEIGHT: 68 IN

## 2023-03-21 PROCEDURE — 20611 DRAIN/INJ JOINT/BURSA W/US: CPT

## 2023-03-21 PROCEDURE — 99212 OFFICE O/P EST SF 10 MIN: CPT | Mod: 25

## 2023-03-21 NOTE — PROCEDURE
[FreeTextEntry3] : Large joint injection was performed  of the left knee. The indication for this procedure was x-ray evidence of Osteoarthritis on this or prior visit. The site was prepped with alcohol and betadine. An injection of Lidocaine 3cc of 1% , Orthovisc 2ml (15mg/ml), # [3] was used. \par \par Patient was advised to call if redness, pain or fever occur and apply ice for 15 minutes out of every hour for the next 12-24 hours as tolerated. \par \par Patient has tried OTC's including aspirin, Ibuprofen, Aleve, etc or prescription NSAIDS, and/or exercises at home and/or physical therapy without satisfactory response, patient had decreased mobility in the joint and the risks benefits, and alternatives have been discussed, and verbal consent was obtained. \par \par The risks, benefits and contents of the injection have been discussed.  Risks include but are not limited to allergic reaction, flare reaction, permanent white skin discoloration at the injection site and infection.  The patient understands the risks and agrees to having the injection.  All questions have been answered.\par \par Ultrasound guidance was indicated for this patient due to prior failure or difficult injection.\par

## 2023-03-21 NOTE — DISCUSSION/SUMMARY
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 Hx of relief with orthovisc injections, discussed repeat series of injections and pt would like to proceed. \par Orthovisc #3 Injection tolerated well. Post injection instructions reviewed.\par 1) wbat, cryotherapy\par 2) rtc 1 week\par \par Entered by Becky Leyva acting as scribe.\par

## 2023-03-21 NOTE — HISTORY OF PRESENT ILLNESS
[2] : 2 [0] : 0 [Dull/Aching] : dull/aching [Localized] : localized [Sharp] : sharp [Intermittent] : intermittent [Rest] : rest [Meds] : meds [Ice] : ice [Exercising] : exercising [Stairs] : stairs [3] : 3 [Orthovisc] : Orthovisc [de-identified] : 3/21/23: Here to f/up left knee and Orthovisc #3\par 3/14/23: Here to f/up left knee and Orthovisc #2\par 2/28/23: Here to f/up left knee. Reports that he obtained good relief after completing the series of orthovisc injections for the left knee. Reports that over the past 1 month he has been seeing a gradual return of his left knee pain which he describes as being located over the anterior aspect. Uses voltaren gel with relief. \par \par 8/24/22: Here for left knee orthovisc #4. Some improvement, better with stairs. \par 8/17/22: Here to f/up left knee and Orthovisc #3\par 8/10/22: F/U left knee orthovisc #2 today. Has had some improvement, less pain since first injection. \par 8/2/22: f/up L knee, orthovisc #1 today\par \par 07/26/22: Here to f/up left knee and review MRI results. \par \par 07/19/22: here for f/up for left knee. has noticed increased pain and soreness in left knee with running. currently training for a marathon. has noticed with increased milage the pain has increased under the kneecap. \par \par 07/12/22: Here to f/up left knee. Has seen partial improvement with his left knee pain. Has been limiting his running distances. Notes some anterior pain when running. \par \par 06/30/22: 51M here with left knee pain for about a week. He noticed anterior knee pain while running. Pain has progressively worsened. He hears some "clicking" in the front of his knee\par \par Hx partial quad tendon tear in 2020 treated non-op\par PMH: none\par \par  [] : Post Surgical Visit: no [FreeTextEntry1] : lt knee  [FreeTextEntry9] : knee brace  [FreeTextEntry5] :  HARJIT IRIZARRY is a 51 year male who is here today for lt knee pain follow up. States he was doing better since gel inj. Pain returned 2 weeks ago after playing basketball, it is not as intense compared to initial visit.   [de-identified] : orthovisc [de-identified] : 3/14/23 [de-identified] : left knee

## 2023-03-28 ENCOUNTER — APPOINTMENT (OUTPATIENT)
Dept: ORTHOPEDIC SURGERY | Facility: CLINIC | Age: 53
End: 2023-03-28
Payer: COMMERCIAL

## 2023-03-28 PROCEDURE — 99212 OFFICE O/P EST SF 10 MIN: CPT | Mod: 25

## 2023-03-28 PROCEDURE — 20611 DRAIN/INJ JOINT/BURSA W/US: CPT | Mod: LT

## 2023-03-28 NOTE — DISCUSSION/SUMMARY
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 Hx of relief with orthovisc injections, discussed repeat series of injections and pt would like to proceed. \par Orthovisc #4 Injection tolerated well. Post injection instructions reviewed.\par 1) wbat, cryotherapy\par 2) rtc 6-8 weeks\par \par Entered by Becky Leyva acting as scribe.\par

## 2023-03-28 NOTE — HISTORY OF PRESENT ILLNESS
[2] : 2 [0] : 0 [Dull/Aching] : dull/aching [Localized] : localized [Sharp] : sharp [Intermittent] : intermittent [Rest] : rest [Meds] : meds [Ice] : ice [Exercising] : exercising [Stairs] : stairs [4] : 4 [Orthovisc] : Orthovisc [de-identified] : 3/28/23: Here to f/up left knee and Orthovsic #4\par 3/21/23: Here to f/up left knee and Orthovisc #3\par 3/14/23: Here to f/up left knee and Orthovisc #2\par 2/28/23: Here to f/up left knee. Reports that he obtained good relief after completing the series of orthovisc injections for the left knee. Reports that over the past 1 month he has been seeing a gradual return of his left knee pain which he describes as being located over the anterior aspect. Uses voltaren gel with relief. \par \par 8/24/22: Here for left knee orthovisc #4. Some improvement, better with stairs. \par 8/17/22: Here to f/up left knee and Orthovisc #3\par 8/10/22: F/U left knee orthovisc #2 today. Has had some improvement, less pain since first injection. \par 8/2/22: f/up L knee, orthovisc #1 today\par \par 07/26/22: Here to f/up left knee and review MRI results. \par \par 07/19/22: here for f/up for left knee. has noticed increased pain and soreness in left knee with running. currently training for a marathon. has noticed with increased milage the pain has increased under the kneecap. \par \par 07/12/22: Here to f/up left knee. Has seen partial improvement with his left knee pain. Has been limiting his running distances. Notes some anterior pain when running. \par \par 06/30/22: 51M here with left knee pain for about a week. He noticed anterior knee pain while running. Pain has progressively worsened. He hears some "clicking" in the front of his knee\par \par Hx partial quad tendon tear in 2020 treated non-op\par PMH: none\par \par  [] : Post Surgical Visit: no [FreeTextEntry1] : lt knee  [FreeTextEntry5] :  HARJIT IRIZARRY is a 51 year male who is here today for lt knee pain follow up. States he was doing better since gel inj. Pain returned 2 weeks ago after playing basketball, it is not as intense compared to initial visit.   [FreeTextEntry9] : knee brace  [de-identified] : orthovisc [de-identified] : 3/21/23 [de-identified] : left knee

## 2023-10-02 NOTE — PROCEDURE
Pt. Called and states she has been bleeding since August , after her depo . Pt. Also needs a colpo . Pt. states she is leaving to go out of town on 10/9/2023 . Appointment scheduled . Pt verbalized understanding.   
[FreeTextEntry3] : Large joint injection was performed  of the left knee. The indication for this procedure was x-ray evidence of Osteoarthritis on this or prior visit. The site was prepped with alcohol and betadine. An injection of Lidocaine 3cc of 1% , Orthovisc 2ml (15mg/ml), # [4] was used. \par \par Patient was advised to call if redness, pain or fever occur and apply ice for 15 minutes out of every hour for the next 12-24 hours as tolerated. \par \par Patient has tried OTC's including aspirin, Ibuprofen, Aleve, etc or prescription NSAIDS, and/or exercises at home and/or physical therapy without satisfactory response, patient had decreased mobility in the joint and the risks benefits, and alternatives have been discussed, and verbal consent was obtained. \par \par The risks, benefits and contents of the injection have been discussed.  Risks include but are not limited to allergic reaction, flare reaction, permanent white skin discoloration at the injection site and infection.  The patient understands the risks and agrees to having the injection.  All questions have been answered.\par \par Ultrasound guidance was indicated for this patient due to prior failure or difficult injection.\par

## 2023-10-03 ENCOUNTER — APPOINTMENT (OUTPATIENT)
Dept: ORTHOPEDIC SURGERY | Facility: CLINIC | Age: 53
End: 2023-10-03
Payer: COMMERCIAL

## 2023-10-03 VITALS — BODY MASS INDEX: 25.16 KG/M2 | WEIGHT: 166 LBS | HEIGHT: 68 IN

## 2023-10-03 PROCEDURE — 99213 OFFICE O/P EST LOW 20 MIN: CPT

## 2023-12-27 NOTE — ASU PATIENT PROFILE, ADULT - ANESTHESIA, PREVIOUS REACTION, PROFILE
TYLENOL FOR FEVER/BODY ACHES  TAMIFLU AS DIRECTED   FOLLOW-UP WITH YOUR OBGYN   RETURN FOR ANY CONCERNS   urinary retention with laparoscopic hernia repair 2016/none

## 2024-01-02 ENCOUNTER — APPOINTMENT (OUTPATIENT)
Dept: ORTHOPEDIC SURGERY | Facility: CLINIC | Age: 54
End: 2024-01-02
Payer: COMMERCIAL

## 2024-01-02 DIAGNOSIS — M22.42 CHONDROMALACIA PATELLAE, LEFT KNEE: ICD-10-CM

## 2024-01-02 PROCEDURE — 73564 X-RAY EXAM KNEE 4 OR MORE: CPT | Mod: LT

## 2024-01-02 PROCEDURE — 20611 DRAIN/INJ JOINT/BURSA W/US: CPT | Mod: LT

## 2024-01-02 PROCEDURE — 99214 OFFICE O/P EST MOD 30 MIN: CPT | Mod: 25

## 2024-01-02 NOTE — HISTORY OF PRESENT ILLNESS
[2] : 2 [0] : 0 [Dull/Aching] : dull/aching [Localized] : localized [Sharp] : sharp [Intermittent] : intermittent [Rest] : rest [Meds] : meds [Ice] : ice [Exercising] : exercising [Stairs] : stairs [4] : 4 [Orthovisc] : Orthovisc [de-identified] : 1/2/24: Here to f/up left knee. Reports that he had been doing well, had been able to run in a marathon. Recently has been seeing gradual worsening of his left knee pain.  10/3/23: Here to f/up left knee s/p completing series of Orthovisc injections with recent return and worsening of left knee pain.  3/28/23: Here to f/up left knee and Orthovsic #4 3/21/23: Here to f/up left knee and Orthovisc #3 3/14/23: Here to f/up left knee and Orthovisc #2 2/28/23: Here to f/up left knee. Reports that he obtained good relief after completing the series of orthovisc injections for the left knee. Reports that over the past 1 month he has been seeing a gradual return of his left knee pain which he describes as being located over the anterior aspect. Uses voltaren gel with relief.   8/24/22: Here for left knee orthovisc #4. Some improvement, better with stairs.  8/17/22: Here to f/up left knee and Orthovisc #3 8/10/22: F/U left knee orthovisc #2 today. Has had some improvement, less pain since first injection.  8/2/22: f/up L knee, orthovisc #1 today  07/26/22: Here to f/up left knee and review MRI results.   07/19/22: here for f/up for left knee. has noticed increased pain and soreness in left knee with running. currently training for a marathon. has noticed with increased milage the pain has increased under the kneecap.   07/12/22: Here to f/up left knee. Has seen partial improvement with his left knee pain. Has been limiting his running distances. Notes some anterior pain when running.   06/30/22: 51M here with left knee pain for about a week. He noticed anterior knee pain while running. Pain has progressively worsened. He hears some "clicking" in the front of his knee  Hx partial quad tendon tear in 2020 treated non-op PMH: none   [] : Post Surgical Visit: no [FreeTextEntry1] : lt knee  [FreeTextEntry5] :  HARJIT IRIZARRY is a 51 year male who is here today for lt knee pain follow up. States pain is the same [FreeTextEntry9] : knee brace  [de-identified] : orthovisc [de-identified] : 3/21/23 [de-identified] : left knee

## 2024-01-02 NOTE — PHYSICAL EXAM
[Left] : left knee [NL (140)] : flexion 140 degrees [NL (0)] : extension 0 degrees [5___] : hamstring 5[unfilled]/5 [Negative] : negative Neal's [] : not mildly antalgic

## 2024-01-02 NOTE — DISCUSSION/SUMMARY
[de-identified] : 52m with chondral defect left patella. Hx of relief with visco injections in the past.  discussed availability of visco injections and pt would like to proceed.  Euflexxa #1 left knee Injection tolerated well. Post injection instructions reviewed. 1) wbat, cryotherapy 2) rtc 1 week   Entered by Becky Leyva acting as scribe. Dr. Erazo- The documentation recorded by the scribe accurately reflects the service I personally performed and the decisions made by me.

## 2024-01-09 ENCOUNTER — APPOINTMENT (OUTPATIENT)
Dept: ORTHOPEDIC SURGERY | Facility: CLINIC | Age: 54
End: 2024-01-09
Payer: COMMERCIAL

## 2024-01-09 PROCEDURE — 20611 DRAIN/INJ JOINT/BURSA W/US: CPT | Mod: LT

## 2024-01-09 NOTE — DISCUSSION/SUMMARY
[de-identified] : 52m with chondral defect left patella. Hx of relief with visco injections in the past.  discussed availability of visco injections and pt would like to proceed.  Euflexxa #2 left knee Injection tolerated well. Post injection instructions reviewed. 1) wbat, cryotherapy 2) rtc 1 week   Entered by Gregoria Daigle acting as scribe. Dr. Erazo- The documentation recorded by the scribe accurately reflects the service I personally performed and the decisions made by me.

## 2024-01-09 NOTE — HISTORY OF PRESENT ILLNESS
[2] : 2 [0] : 0 [Dull/Aching] : dull/aching [Localized] : localized [Sharp] : sharp [Intermittent] : intermittent [Rest] : rest [Meds] : meds [Ice] : ice [Exercising] : exercising [Stairs] : stairs [Orthovisc] : Orthovisc [de-identified] : 1/9/24: Here to f/up left knee and Orthovisc #2 1/2/24: Here to f/up left knee. Reports that he had been doing well, had been able to run in a marathon. Recently has been seeing gradual worsening of his left knee pain.  10/3/23: Here to f/up left knee s/p completing series of Orthovisc injections with recent return and worsening of left knee pain.  3/28/23: Here to f/up left knee and Orthovsic #4 3/21/23: Here to f/up left knee and Orthovisc #3 3/14/23: Here to f/up left knee and Orthovisc #2 2/28/23: Here to f/up left knee. Reports that he obtained good relief after completing the series of orthovisc injections for the left knee. Reports that over the past 1 month he has been seeing a gradual return of his left knee pain which he describes as being located over the anterior aspect. Uses voltaren gel with relief.   8/24/22: Here for left knee orthovisc #4. Some improvement, better with stairs.  8/17/22: Here to f/up left knee and Orthovisc #3 8/10/22: F/U left knee orthovisc #2 today. Has had some improvement, less pain since first injection.  8/2/22: f/up L knee, orthovisc #1 today  07/26/22: Here to f/up left knee and review MRI results.   07/19/22: here for f/up for left knee. has noticed increased pain and soreness in left knee with running. currently training for a marathon. has noticed with increased milage the pain has increased under the kneecap.   07/12/22: Here to f/up left knee. Has seen partial improvement with his left knee pain. Has been limiting his running distances. Notes some anterior pain when running.   06/30/22: 51M here with left knee pain for about a week. He noticed anterior knee pain while running. Pain has progressively worsened. He hears some "clicking" in the front of his knee  Hx partial quad tendon tear in 2020 treated non-op PMH: none   [] : Post Surgical Visit: no [FreeTextEntry1] : lt knee  [FreeTextEntry5] :  HARJIT IRIZARRY is a 51 year male who is here today for lt knee pain follow up. States pain is the same [FreeTextEntry9] : knee brace  [de-identified] : orthovisc [de-identified] : left knee

## 2024-01-16 ENCOUNTER — APPOINTMENT (OUTPATIENT)
Dept: ORTHOPEDIC SURGERY | Facility: CLINIC | Age: 54
End: 2024-01-16
Payer: COMMERCIAL

## 2024-01-16 DIAGNOSIS — M23.8X2 OTHER INTERNAL DERANGEMENTS OF LEFT KNEE: ICD-10-CM

## 2024-01-16 PROCEDURE — 20611 DRAIN/INJ JOINT/BURSA W/US: CPT | Mod: LT

## 2024-01-17 NOTE — HISTORY OF PRESENT ILLNESS
Patient ID: Anna Santos is a 63 y.o. female who presents for No chief complaint on file..  HPI  Ozempic sample    Objective   Physical Exam  There were no vitals taken for this visit.   There were no vitals filed for this visit.   There is no height or weight on file to calculate BMI.          Assessment/Plan              [2] : 2 [0] : 0 [Dull/Aching] : dull/aching [Localized] : localized [Sharp] : sharp [Intermittent] : intermittent [Rest] : rest [Meds] : meds [Ice] : ice [Exercising] : exercising [Stairs] : stairs [Orthovisc] : Orthovisc [de-identified] : 1/16/24: Here for f/up left knee and Euflexxa #3 1/9/24: Here to f/up left knee and Euflexxa #2/ /1/2/24: Here to f/up left knee. Reports that he had been doing well, had been able to run in a marathon. Recently has been seeing gradual worsening of his left knee pain.  10/3/23: Here to f/up left knee s/p completing series of Orthovisc injections with recent return and worsening of left knee pain.  3/28/23: Here to f/up left knee and Orthovsic #4 3/21/23: Here to f/up left knee and Orthovisc #3 3/14/23: Here to f/up left knee and Orthovisc #2 2/28/23: Here to f/up left knee. Reports that he obtained good relief after completing the series of orthovisc injections for the left knee. Reports that over the past 1 month he has been seeing a gradual return of his left knee pain which he describes as being located over the anterior aspect. Uses voltaren gel with relief.   8/24/22: Here for left knee orthovisc #4. Some improvement, better with stairs.  8/17/22: Here to f/up left knee and Orthovisc #3 8/10/22: F/U left knee orthovisc #2 today. Has had some improvement, less pain since first injection.  8/2/22: f/up L knee, orthovisc #1 today  07/26/22: Here to f/up left knee and review MRI results.   07/19/22: here for f/up for left knee. has noticed increased pain and soreness in left knee with running. currently training for a marathon. has noticed with increased milage the pain has increased under the kneecap.   07/12/22: Here to f/up left knee. Has seen partial improvement with his left knee pain. Has been limiting his running distances. Notes some anterior pain when running.   06/30/22: 51M here with left knee pain for about a week. He noticed anterior knee pain while running. Pain has progressively worsened. He hears some "clicking" in the front of his knee  Hx partial quad tendon tear in 2020 treated non-op PMH: none   [] : Post Surgical Visit: no [FreeTextEntry1] : lt knee  [FreeTextEntry5] :  HARJIT IRIZARRY is a 51 year male who is here today for lt knee pain follow up. States pain is the same [FreeTextEntry9] : knee brace  [de-identified] : orthovisc [de-identified] : left knee

## 2024-01-17 NOTE — DISCUSSION/SUMMARY
[de-identified] : 52m with chondral defect left patella. Hx of relief with visco injections in the past.  discussed availability of visco injections and pt would like to proceed.  Euflexxa #3 left knee Injection tolerated well. Post injection instructions reviewed. 1) wbat, cryotherapy 2) rtc prn   Entered by Allison Corea acting as scribe. Dr. Erazo- The documentation recorded by the scribe accurately reflects the service I personally performed and the decisions made by me.

## 2024-01-17 NOTE — PROCEDURE
[FreeTextEntry3] : Large joint injection was performed  of the left knee. The indication for this procedure was x-ray evidence of Osteoarthritis on this or prior visit. The site was prepped with alcohol and betadine. An injection of Lidocaine 3cc of 1% , Euflexxa 2ml, # 3  was used.   Patient was advised to call if redness, pain or fever occur and apply ice for 15 minutes out of every hour for the next 12-24 hours as tolerated.   Patient has tried OTC's including aspirin, Ibuprofen, Aleve, etc or prescription NSAIDS, and/or exercises at home and/or physical therapy without satisfactory response, patient had decreased mobility in the joint and the risks benefits, and alternatives have been discussed, and verbal consent was obtained.   The risks, benefits and contents of the injection have been discussed.  Risks include but are not limited to allergic reaction, flare reaction, permanent white skin discoloration at the injection site and infection.  The patient understands the risks and agrees to having the injection.  All questions have been answered.  Ultrasound guidance was indicated for this patient due to prior failure or difficult injection.

## 2024-08-06 ENCOUNTER — APPOINTMENT (OUTPATIENT)
Dept: ORTHOPEDIC SURGERY | Facility: CLINIC | Age: 54
End: 2024-08-06

## 2024-08-06 PROCEDURE — 20611 DRAIN/INJ JOINT/BURSA W/US: CPT | Mod: LT

## 2024-08-06 PROCEDURE — 99213 OFFICE O/P EST LOW 20 MIN: CPT | Mod: 25

## 2024-08-07 NOTE — HISTORY OF PRESENT ILLNESS
[de-identified] : 8/6/2024: Here for f/u L knee. He was doing well s/p Euflexxa series until about a week ago when he began having anterior/medial knee pain again. C/o pain with running and stiffness at night.  1/16/24: Here for f/up left knee and Euflexxa #3 1/9/24: Here to f/up left knee and Euflexxa #2/ /1/2/24: Here to f/up left knee. Reports that he had been doing well, had been able to run in a marathon. Recently has been seeing gradual worsening of his left knee pain.  10/3/23: Here to f/up left knee s/p completing series of Orthovisc injections with recent return and worsening of left knee pain.  3/28/23: Here to f/up left knee and Orthovsic #4 3/21/23: Here to f/up left knee and Orthovisc #3 3/14/23: Here to f/up left knee and Orthovisc #2 2/28/23: Here to f/up left knee. Reports that he obtained good relief after completing the series of orthovisc injections for the left knee. Reports that over the past 1 month he has been seeing a gradual return of his left knee pain which he describes as being located over the anterior aspect. Uses voltaren gel with relief.   8/24/22: Here for left knee orthovisc #4. Some improvement, better with stairs.  8/17/22: Here to f/up left knee and Orthovisc #3 8/10/22: F/U left knee orthovisc #2 today. Has had some improvement, less pain since first injection.  8/2/22: f/up L knee, orthovisc #1 today  07/26/22: Here to f/up left knee and review MRI results.   07/19/22: here for f/up for left knee. has noticed increased pain and soreness in left knee with running. currently training for a marathon. has noticed with increased milage the pain has increased under the kneecap.   07/12/22: Here to f/up left knee. Has seen partial improvement with his left knee pain. Has been limiting his running distances. Notes some anterior pain when running.   06/30/22: 51M here with left knee pain for about a week. He noticed anterior knee pain while running. Pain has progressively worsened. He hears some "clicking" in the front of his knee  Hx partial quad tendon tear in 2020 treated non-op PMH: none

## 2024-08-07 NOTE — DISCUSSION/SUMMARY
[de-identified] : 52m with chondral defect left patella. Hx of relief with visco injections in the past.  discussed availability of visco injections and pt would like to proceed.  Euflexxa #1 left knee Injection tolerated well. Post injection instructions reviewed. 1) wbat, cryotherapy 2) rtc 1 week  Entered by Becky Leyva acting as scribe. Dr. Erazo- The documentation recorded by the scribe accurately reflects the service I personally performed and the decisions made by me.

## 2024-08-07 NOTE — HISTORY OF PRESENT ILLNESS
[de-identified] : 8/6/2024: Here for f/u L knee. He was doing well s/p Euflexxa series until about a week ago when he began having anterior/medial knee pain again. C/o pain with running and stiffness at night.  1/16/24: Here for f/up left knee and Euflexxa #3 1/9/24: Here to f/up left knee and Euflexxa #2/ /1/2/24: Here to f/up left knee. Reports that he had been doing well, had been able to run in a marathon. Recently has been seeing gradual worsening of his left knee pain.  10/3/23: Here to f/up left knee s/p completing series of Orthovisc injections with recent return and worsening of left knee pain.  3/28/23: Here to f/up left knee and Orthovsic #4 3/21/23: Here to f/up left knee and Orthovisc #3 3/14/23: Here to f/up left knee and Orthovisc #2 2/28/23: Here to f/up left knee. Reports that he obtained good relief after completing the series of orthovisc injections for the left knee. Reports that over the past 1 month he has been seeing a gradual return of his left knee pain which he describes as being located over the anterior aspect. Uses voltaren gel with relief.   8/24/22: Here for left knee orthovisc #4. Some improvement, better with stairs.  8/17/22: Here to f/up left knee and Orthovisc #3 8/10/22: F/U left knee orthovisc #2 today. Has had some improvement, less pain since first injection.  8/2/22: f/up L knee, orthovisc #1 today  07/26/22: Here to f/up left knee and review MRI results.   07/19/22: here for f/up for left knee. has noticed increased pain and soreness in left knee with running. currently training for a marathon. has noticed with increased milage the pain has increased under the kneecap.   07/12/22: Here to f/up left knee. Has seen partial improvement with his left knee pain. Has been limiting his running distances. Notes some anterior pain when running.   06/30/22: 51M here with left knee pain for about a week. He noticed anterior knee pain while running. Pain has progressively worsened. He hears some "clicking" in the front of his knee  Hx partial quad tendon tear in 2020 treated non-op PMH: none

## 2024-08-07 NOTE — DISCUSSION/SUMMARY
[de-identified] : 52m with chondral defect left patella. Hx of relief with visco injections in the past.  discussed availability of visco injections and pt would like to proceed.  Euflexxa #1 left knee Injection tolerated well. Post injection instructions reviewed. 1) wbat, cryotherapy 2) rtc 1 week  Entered by Becky Leyva acting as scribe. Dr. Erazo- The documentation recorded by the scribe accurately reflects the service I personally performed and the decisions made by me.

## 2024-08-13 ENCOUNTER — APPOINTMENT (OUTPATIENT)
Dept: ORTHOPEDIC SURGERY | Facility: CLINIC | Age: 54
End: 2024-08-13
Payer: COMMERCIAL

## 2024-08-13 VITALS — HEIGHT: 68 IN | WEIGHT: 166 LBS | BODY MASS INDEX: 25.16 KG/M2

## 2024-08-13 DIAGNOSIS — M23.8X2 OTHER INTERNAL DERANGEMENTS OF LEFT KNEE: ICD-10-CM

## 2024-08-13 DIAGNOSIS — M22.42 CHONDROMALACIA PATELLAE, LEFT KNEE: ICD-10-CM

## 2024-08-13 PROCEDURE — 20611 DRAIN/INJ JOINT/BURSA W/US: CPT | Mod: LT

## 2024-08-13 NOTE — PHYSICAL EXAM
[Left] : left knee [NL (140)] : flexion 140 degrees [NL (0)] : extension 0 degrees [5___] : hamstring 5[unfilled]/5 [Negative] : negative Neal's [] : patient ambulates without assistive device

## 2024-08-27 ENCOUNTER — APPOINTMENT (OUTPATIENT)
Dept: ORTHOPEDIC SURGERY | Facility: CLINIC | Age: 54
End: 2024-08-27
Payer: COMMERCIAL

## 2024-08-27 VITALS — WEIGHT: 166 LBS | BODY MASS INDEX: 25.16 KG/M2 | HEIGHT: 68 IN

## 2024-08-27 DIAGNOSIS — M22.42 CHONDROMALACIA PATELLAE, LEFT KNEE: ICD-10-CM

## 2024-08-27 DIAGNOSIS — M23.8X2 OTHER INTERNAL DERANGEMENTS OF LEFT KNEE: ICD-10-CM

## 2024-08-27 PROCEDURE — 20611 DRAIN/INJ JOINT/BURSA W/US: CPT | Mod: LT

## 2024-08-27 NOTE — HISTORY OF PRESENT ILLNESS
[2] : 2 [] : yes [de-identified] : 8/27/24:Pt here to f/u L knee and euflexxa #3. States feeling better, minimal to none symptoms.  8/13/24: Here for f/up left knee and euflexxa #2  8/6/2024: Here for f/u L knee. He was doing well s/p Euflexxa series until about a week ago when he began having anterior/medial knee pain again. C/o pain with running and stiffness at night.  1/16/24: Here for f/up left knee and Euflexxa #3 1/9/24: Here to f/up left knee and Euflexxa #2/ /1/2/24: Here to f/up left knee. Reports that he had been doing well, had been able to run in a marathon. Recently has been seeing gradual worsening of his left knee pain.  10/3/23: Here to f/up left knee s/p completing series of Orthovisc injections with recent return and worsening of left knee pain.  3/28/23: Here to f/up left knee and Orthovsic #4 3/21/23: Here to f/up left knee and Orthovisc #3 3/14/23: Here to f/up left knee and Orthovisc #2 2/28/23: Here to f/up left knee. Reports that he obtained good relief after completing the series of orthovisc injections for the left knee. Reports that over the past 1 month he has been seeing a gradual return of his left knee pain which he describes as being located over the anterior aspect. Uses voltaren gel with relief.   8/24/22: Here for left knee orthovisc #4. Some improvement, better with stairs.  8/17/22: Here to f/up left knee and Orthovisc #3 8/10/22: F/U left knee orthovisc #2 today. Has had some improvement, less pain since first injection.  8/2/22: f/up L knee, orthovisc #1 today  07/26/22: Here to f/up left knee and review MRI results.   07/19/22: here for f/up for left knee. has noticed increased pain and soreness in left knee with running. currently training for a marathon. has noticed with increased milage the pain has increased under the kneecap.   07/12/22: Here to f/up left knee. Has seen partial improvement with his left knee pain. Has been limiting his running distances. Notes some anterior pain when running.   06/30/22: 51M here with left knee pain for about a week. He noticed anterior knee pain while running. Pain has progressively worsened. He hears some "clicking" in the front of his knee  Hx partial quad tendon tear in 2020 treated non-op PMH: none   [FreeTextEntry1] : LT knee  [FreeTextEntry5] : Euflexxa #2, LT knee today.

## 2024-08-27 NOTE — DISCUSSION/SUMMARY
[de-identified] : 52m with chondral defect left patella. Hx of relief with visco injections in the past.  discussed availability of visco injections and pt would like to proceed.  Euflexxa #3 left knee Injection tolerated well. Post injection instructions reviewed. 1) wbat, cryotherapy 2) rtc 6 weeks / prn   Entered by Becky Leyva acting as scribe. Dr. Erazo- The documentation recorded by the scribe accurately reflects the service I personally performed and the decisions made by me.

## 2025-03-11 ENCOUNTER — APPOINTMENT (OUTPATIENT)
Dept: ORTHOPEDIC SURGERY | Facility: CLINIC | Age: 55
End: 2025-03-11
Payer: COMMERCIAL

## 2025-03-11 VITALS — WEIGHT: 166 LBS | HEIGHT: 68 IN | BODY MASS INDEX: 25.16 KG/M2

## 2025-03-11 VITALS — WEIGHT: 173 LBS | BODY MASS INDEX: 25.62 KG/M2 | HEIGHT: 69 IN

## 2025-03-11 PROCEDURE — 99213 OFFICE O/P EST LOW 20 MIN: CPT | Mod: 25

## 2025-03-11 PROCEDURE — 73564 X-RAY EXAM KNEE 4 OR MORE: CPT | Mod: LT

## 2025-03-11 PROCEDURE — 20611 DRAIN/INJ JOINT/BURSA W/US: CPT | Mod: LT

## 2025-03-11 RX ORDER — LISINOPRIL 30 MG/1
TABLET ORAL
Refills: 0 | Status: ACTIVE | COMMUNITY

## 2025-03-18 ENCOUNTER — APPOINTMENT (OUTPATIENT)
Dept: ORTHOPEDIC SURGERY | Facility: CLINIC | Age: 55
End: 2025-03-18
Payer: COMMERCIAL

## 2025-03-18 VITALS — WEIGHT: 173 LBS | BODY MASS INDEX: 25.62 KG/M2 | HEIGHT: 69 IN

## 2025-03-18 PROCEDURE — 20611 DRAIN/INJ JOINT/BURSA W/US: CPT | Mod: LT

## 2025-03-25 ENCOUNTER — APPOINTMENT (OUTPATIENT)
Dept: ORTHOPEDIC SURGERY | Facility: CLINIC | Age: 55
End: 2025-03-25
Payer: COMMERCIAL

## 2025-03-25 VITALS — HEIGHT: 69 IN | WEIGHT: 173 LBS | BODY MASS INDEX: 25.62 KG/M2

## 2025-03-25 DIAGNOSIS — M22.42 CHONDROMALACIA PATELLAE, LEFT KNEE: ICD-10-CM

## 2025-03-25 DIAGNOSIS — M23.8X2 OTHER INTERNAL DERANGEMENTS OF LEFT KNEE: ICD-10-CM

## 2025-03-25 PROCEDURE — 20611 DRAIN/INJ JOINT/BURSA W/US: CPT | Mod: LT

## (undated) DEVICE — POSITIONER FOAM EGG CRATE ULNAR 2PCS (PINK)

## (undated) DEVICE — GOWN TRIMAX XXL

## (undated) DEVICE — SUT POLYSORB 3-0 30" V-20 UNDYED

## (undated) DEVICE — VENODYNE/SCD SLEEVE CALF MEDIUM

## (undated) DEVICE — NDL SAFETY BUTTERFLY 21G X 3/4

## (undated) DEVICE — DRAPE EXTREMITY 87" X 128.5"

## (undated) DEVICE — BLADE SCALPEL SAFETYLOCK #15

## (undated) DEVICE — SUT POLYSORB 2-0 30" V-20 UNDYED

## (undated) DEVICE — TUBING FLUID ADMINISTRATION SET PRIM 70"

## (undated) DEVICE — SOL IRR POUR NS 0.9% 500ML

## (undated) DEVICE — SUT SOFSILK 2-0 30" V-20

## (undated) DEVICE — DRSG KLING 3"

## (undated) DEVICE — TAPE TENSOPLAST 2" X 5 YDS

## (undated) DEVICE — ELCTR BOVIE TIP BLADE INSULATED 2.75" EDGE

## (undated) DEVICE — SPECIMEN CONTAINER 100ML

## (undated) DEVICE — BLADE SURGICAL #11 CARBON

## (undated) DEVICE — SUT MONOCRYL 4-0 27" PS-2 UNDYED

## (undated) DEVICE — WARMING BLANKET UPPER ADULT

## (undated) DEVICE — DRSG XEROFORM 1 X 8"

## (undated) DEVICE — LONE STAR RETRACTOR RING 32.5CM X 18.3CM DISP

## (undated) DEVICE — SYR LUER LOK 50CC

## (undated) DEVICE — Device

## (undated) DEVICE — PACK MINOR

## (undated) DEVICE — MARKING PEN W RULER

## (undated) DEVICE — LONE STAR ELASTIC STAY HOOK 12MM BLUNT

## (undated) DEVICE — DRAPE INSTRUMENT POUCH 6.75" X 11"

## (undated) DEVICE — GLV 7.5 PROTEXIS (CREAM) NEU-THERA

## (undated) DEVICE — SUT VICRYL PLUS 2-0 27" CTB-1 UNDYED

## (undated) DEVICE — SOL IRR POUR H2O 250ML